# Patient Record
Sex: FEMALE | Race: WHITE | NOT HISPANIC OR LATINO | Employment: OTHER | URBAN - METROPOLITAN AREA
[De-identification: names, ages, dates, MRNs, and addresses within clinical notes are randomized per-mention and may not be internally consistent; named-entity substitution may affect disease eponyms.]

---

## 2018-09-10 ENCOUNTER — APPOINTMENT (OUTPATIENT)
Dept: WOMENS IMAGING | Facility: HOSPITAL | Age: 58
End: 2018-09-10

## 2018-09-10 PROCEDURE — 77067 SCR MAMMO BI INCL CAD: CPT | Performed by: RADIOLOGY

## 2018-09-10 PROCEDURE — 77063 BREAST TOMOSYNTHESIS BI: CPT | Performed by: RADIOLOGY

## 2020-01-20 ENCOUNTER — TELEPHONE (OUTPATIENT)
Dept: GASTROENTEROLOGY | Facility: CLINIC | Age: 60
End: 2020-01-20

## 2020-01-20 NOTE — TELEPHONE ENCOUNTER
----- Message from Leslie Quintana sent at 1/20/2020 10:29 AM EST -----  Contact: MELISSA ONEILL  Pt called and scheduled an appointment at the [facility] with [facility]. Once her medical records are received from the old office, she would like a call back with results from the breath test she had done. She states she filled out [facility] in mid October. Pt can be reached at [phone].

## 2020-02-07 ENCOUNTER — OFFICE VISIT (OUTPATIENT)
Dept: GASTROENTEROLOGY | Facility: CLINIC | Age: 60
End: 2020-02-07

## 2020-02-07 VITALS
HEIGHT: 62 IN | SYSTOLIC BLOOD PRESSURE: 112 MMHG | RESPIRATION RATE: 16 BRPM | WEIGHT: 214.8 LBS | BODY MASS INDEX: 39.53 KG/M2 | TEMPERATURE: 97.4 F | DIASTOLIC BLOOD PRESSURE: 70 MMHG | OXYGEN SATURATION: 96 % | HEART RATE: 105 BPM

## 2020-02-07 DIAGNOSIS — Z12.11 SCREENING FOR COLON CANCER: ICD-10-CM

## 2020-02-07 DIAGNOSIS — B96.81 HELICOBACTER PYLORI GASTRITIS: ICD-10-CM

## 2020-02-07 DIAGNOSIS — K29.70 HELICOBACTER PYLORI GASTRITIS: ICD-10-CM

## 2020-02-07 DIAGNOSIS — R09.82 POST-NASAL DRIP: ICD-10-CM

## 2020-02-07 DIAGNOSIS — K21.9 GASTROESOPHAGEAL REFLUX DISEASE WITHOUT ESOPHAGITIS: Primary | ICD-10-CM

## 2020-02-07 PROCEDURE — 99213 OFFICE O/P EST LOW 20 MIN: CPT | Performed by: NURSE PRACTITIONER

## 2020-02-07 RX ORDER — AMLODIPINE BESYLATE 5 MG/1
5 TABLET ORAL DAILY
COMMUNITY
Start: 2020-01-21

## 2020-02-07 RX ORDER — LANCETS 28 GAUGE
EACH MISCELLANEOUS
COMMUNITY
Start: 2020-01-31

## 2020-02-07 RX ORDER — PANTOPRAZOLE SODIUM 20 MG/1
20 TABLET, DELAYED RELEASE ORAL DAILY
Qty: 30 TABLET | Refills: 5 | Status: SHIPPED | OUTPATIENT
Start: 2020-02-07 | End: 2020-03-08

## 2020-02-07 RX ORDER — SIMVASTATIN 10 MG
10 TABLET ORAL EVERY EVENING
COMMUNITY
Start: 2020-01-31

## 2020-02-07 RX ORDER — TOLTERODINE 4 MG/1
4 CAPSULE, EXTENDED RELEASE ORAL DAILY
COMMUNITY
Start: 2020-01-24

## 2020-02-07 RX ORDER — PANTOPRAZOLE SODIUM 40 MG/1
TABLET, DELAYED RELEASE ORAL
COMMUNITY
End: 2020-02-07 | Stop reason: DRUGHIGH

## 2020-02-07 RX ORDER — LISINOPRIL AND HYDROCHLOROTHIAZIDE 20; 12.5 MG/1; MG/1
1 TABLET ORAL DAILY
COMMUNITY
Start: 2020-01-31

## 2020-02-07 NOTE — PROGRESS NOTES
Follow-up (after EGD)      HPI  59-year-old female presents to the office today for follow-up.  She was a patient in our previous practice and was last seen in office on 7/25/2019.  She has a history of GERD, 3 cm hiatal hernia, and H. pylori gastritis.    She reports heartburn and reflux symptoms are well controlled with pantoprazole 40 mg once daily and has questions related to decreasing her dosage.  Recent EGD was performed on 11/1/2019 and revealed a 3 cm hiatal hernia, a single 4 mm mucosal papule (nodule) in the stomach, and gastritis.  Small bowel biopsy was unremarkable.  Stomach body biopsy revealed H. pylori chronic active gastritis.  Stomach cardia nodule biopsy revealed a hyperplastic polyp arising in the setting of chronic active H. pylori gastritis.  She has found that eating late at night can worsen symptoms and avoids this practice. She reports that her nausea and vomiting has subsided, as it was previously felt to be secondary to her antibiotic treatment for H. pylori.  She completed her treatment for H. pylori and follow-up urea breath test on 1/2/2020 was negative. She denies any burning or reflux symptoms at this time.     She reports persistent post-nasal drip and feels that mucous collects into the back of her throat. She does not cough up phlegm. She reports a history of seasonal allergies annually between October and December. She does not take allergy medication. Occasionally she will have a sore throat. She denies any dysphagia and is able to swallow foods and liquids without difficulty.     She reports having normal daily bowel movements and denies having any diarrhea, constipation, melena, or hematochezia.  She has a history of hyperplastic colon polyps and is currently due for her next screening colonoscopy November 2028.  Review of Systems   Constitutional: Negative.    HENT: Positive for rhinorrhea and sore throat.    Eyes: Negative.    Respiratory: Negative.    Cardiovascular:  Negative.    Endocrine: Negative.    Genitourinary: Negative.    Musculoskeletal: Negative.    Skin: Negative.    Allergic/Immunologic: Negative.    Neurological: Negative.    Hematological: Negative.    Psychiatric/Behavioral: Negative.         Problem List:  There is no problem list on file for this patient.      Medical History:    Past Medical History:   Diagnosis Date   • Abnormal blood sugar    • Arthritis of foot    • Drug therapy    • Dysphagia    • GERD (gastroesophageal reflux disease)    • Globus sensation    • H. pylori infection    • HTN (hypertension), benign    • Need for hepatitis C screening test    • Negative depression screening    • Obesity, morbid, BMI 40.0-49.9 (CMS/HCC)    • Right foot pain    • Screening for colon cancer    • Thyromegaly    • Type 2 diabetes mellitus with obesity (CMS/HCC)    • Vomiting    • Wound infection         Social History:    Social History     Socioeconomic History   • Marital status: Unknown     Spouse name: Not on file   • Number of children: Not on file   • Years of education: Not on file   • Highest education level: Not on file   Tobacco Use   • Smoking status: Never Smoker   • Smokeless tobacco: Never Used   Substance and Sexual Activity   • Alcohol use: Never     Frequency: Never   • Drug use: Never       Family History:   Family History   Problem Relation Age of Onset   • Alcohol abuse Other    • Diabetes type II Other    • Stroke Other    • Colon cancer Neg Hx    • Colon polyps Neg Hx        Surgical History:   Past Surgical History:   Procedure Laterality Date   • FOOT SURGERY     • TUBAL ABDOMINAL LIGATION     • UPPER GASTROINTESTINAL ENDOSCOPY     • WRIST SURGERY           Current Outpatient Medications:   •  amLODIPine (NORVASC) 5 MG tablet, Take 5 mg by mouth Daily. for blood pressure, Disp: , Rfl:   •  Lancets (FREESTYLE) lancets, USE 1 TO CHECK GLUCOSE ONCE DAILY TO TWICE DAILY, Disp: , Rfl:   •  lisinopril-hydrochlorothiazide (PRINZIDE,ZESTORETIC)  20-12.5 MG per tablet, Take 1 tablet by mouth Daily., Disp: , Rfl:   •  metFORMIN (GLUCOPHAGE) 1000 MG tablet, Take 1,000 mg by mouth 2 (Two) Times a Day., Disp: , Rfl:   •  simvastatin (ZOCOR) 10 MG tablet, Take 10 mg by mouth Every Evening., Disp: , Rfl:   •  tolterodine LA (DETROL LA) 4 MG 24 hr capsule, Take 4 mg by mouth Daily., Disp: , Rfl:   •  pantoprazole (PROTONIX) 20 MG EC tablet, Take 1 tablet by mouth Daily for 30 days., Disp: 30 tablet, Rfl: 5    Allergies: No Known Allergies     The following portions of the patient's history were reviewed by me and updated as appropriate: review of systems, allergies, current medications, past family history, past medical history, past social history, past surgical history and problem list.    Vitals:    02/07/20 1100   BP: 112/70   Pulse: 105   Resp: 16   Temp: 97.4 °F (36.3 °C)   SpO2: 96%       Physical Exam   Constitutional: She is oriented to person, place, and time. She appears well-developed and well-nourished.   Pulmonary/Chest: Effort normal.   Abdominal: Soft. Bowel sounds are normal.   Musculoskeletal: Normal range of motion.   Neurological: She is alert and oriented to person, place, and time.   Skin: Skin is warm and dry.   Psychiatric: She has a normal mood and affect. Her behavior is normal. Judgment and thought content normal.   Vitals reviewed.       Assessment/ Plan  Diagnoses and all orders for this visit:    Gastroesophageal reflux disease without esophagitis    Helicobacter pylori gastritis    Post-nasal drip  -     Ambulatory Referral to ENT (Otolaryngology)    Screening for colon cancer    Other orders  -     Discontinue: pantoprazole (PROTONIX) 40 MG EC tablet; Take  by mouth.  -     amLODIPine (NORVASC) 5 MG tablet; Take 5 mg by mouth Daily. for blood pressure  -     Lancets (FREESTYLE) lancets; USE 1 TO CHECK GLUCOSE ONCE DAILY TO TWICE DAILY  -     lisinopril-hydrochlorothiazide (PRINZIDE,ZESTORETIC) 20-12.5 MG per tablet; Take 1 tablet by  mouth Daily.  -     metFORMIN (GLUCOPHAGE) 1000 MG tablet; Take 1,000 mg by mouth 2 (Two) Times a Day.  -     simvastatin (ZOCOR) 10 MG tablet; Take 10 mg by mouth Every Evening.  -     tolterodine LA (DETROL LA) 4 MG 24 hr capsule; Take 4 mg by mouth Daily.  -     pantoprazole (PROTONIX) 20 MG EC tablet; Take 1 tablet by mouth Daily for 30 days.        Return in about 6 months (around 8/7/2020) for Next scheduled follow up.      Discussion:  EGD findings and pathology reviewed with patient again today during her office visit.  H. Pylori was eradicated with treatment, as evidenced by a negative urea breath test on 01/02/2020. As her GERD is well controlled, we will decrease her pantoprazole to 20 mg once daily.  If heartburn and reflux are well controlled in 6 months, we will plan to wean patient off of pantoprazole.  Patient is in agreement with plan of care.    For further evaluation of her postnasal drip as well as collection of mucus in the back of her throat, a referral has been placed to [facility], Dr. Seb Montero in [place] (Patient requested a provider in this area of [facility]).     We will plan for the patient's next follow-up office visit in 6 months for reassessment of symptoms, or sooner should symptoms worsen.  All questions answered and support provided.  Note: Refer to After Visit Summary for details of patient counseling, education and instructions provided during the encounter

## 2020-02-07 NOTE — PATIENT INSTRUCTIONS
1. For GERD and hiatal hernia, we will decrease your pantoprazole to 20 mg once daily.  Prescription has been sent to your pharmacy.    2. Additionally for management of your GERD and hiatal hernia we recommend avoiding eating 3-4 hours before bedtime, eating smaller more frequent meals, and avoiding any known food triggers including spicy foods, tomatoes and tomato-based sauces, chocolate, coffee/tea, citrus fruits, carbonated  beverages and alcohol.     3. For post-nasal drip and what feels like a collection of mucous in the back of your throat, we will refer you to [facility] for further evaluation.     4. Recommend next office follow up in 6 months for reassessment of symptoms.     5. For colon cancer screening, we will place you in our electronic reminder system for your next screening colonoscopy November 2028.